# Patient Record
Sex: FEMALE | Race: WHITE
[De-identification: names, ages, dates, MRNs, and addresses within clinical notes are randomized per-mention and may not be internally consistent; named-entity substitution may affect disease eponyms.]

---

## 2018-04-07 ENCOUNTER — HOSPITAL ENCOUNTER (INPATIENT)
Dept: HOSPITAL 92 - ERS | Age: 53
LOS: 2 days | Discharge: HOME | DRG: 683 | End: 2018-04-09
Attending: FAMILY MEDICINE | Admitting: FAMILY MEDICINE
Payer: SELF-PAY

## 2018-04-07 VITALS — BODY MASS INDEX: 36.8 KG/M2

## 2018-04-07 DIAGNOSIS — Z88.1: ICD-10-CM

## 2018-04-07 DIAGNOSIS — Z88.8: ICD-10-CM

## 2018-04-07 DIAGNOSIS — R82.71: ICD-10-CM

## 2018-04-07 DIAGNOSIS — Z79.4: ICD-10-CM

## 2018-04-07 DIAGNOSIS — E11.65: ICD-10-CM

## 2018-04-07 DIAGNOSIS — J01.90: ICD-10-CM

## 2018-04-07 DIAGNOSIS — Z91.040: ICD-10-CM

## 2018-04-07 DIAGNOSIS — E11.22: ICD-10-CM

## 2018-04-07 DIAGNOSIS — Z91.14: ICD-10-CM

## 2018-04-07 DIAGNOSIS — N18.3: ICD-10-CM

## 2018-04-07 DIAGNOSIS — N17.9: Primary | ICD-10-CM

## 2018-04-07 DIAGNOSIS — E87.2: ICD-10-CM

## 2018-04-07 DIAGNOSIS — I12.9: ICD-10-CM

## 2018-04-07 LAB
ALBUMIN SERPL BCG-MCNC: 3.5 G/DL (ref 3.5–5)
ALP SERPL-CCNC: 97 U/L (ref 40–150)
ALT SERPL W P-5'-P-CCNC: 14 U/L (ref 8–55)
ANION GAP SERPL CALC-SCNC: 14 MMOL/L (ref 10–20)
ANION GAP SERPL CALC-SCNC: 15 MMOL/L (ref 10–20)
ANION GAP SERPL CALC-SCNC: 15 MMOL/L (ref 10–20)
ANION GAP SERPL CALC-SCNC: 18 MMOL/L (ref 10–20)
AST SERPL-CCNC: 11 U/L (ref 5–34)
BASOPHILS # BLD AUTO: 0.1 THOU/UL (ref 0–0.2)
BASOPHILS NFR BLD AUTO: 0.6 % (ref 0–1)
BICARBONATE (HCO3V): 18.2 MMOL/L (ref 1–85)
BILIRUB SERPL-MCNC: 0.3 MG/DL (ref 0.2–1.2)
BUN SERPL-MCNC: 28 MG/DL (ref 9.8–20.1)
BUN SERPL-MCNC: 29 MG/DL (ref 9.8–20.1)
BUN SERPL-MCNC: 31 MG/DL (ref 9.8–20.1)
BUN SERPL-MCNC: 32 MG/DL (ref 9.8–20.1)
CA-I BLD-SCNC: 1.14 MMOL/L (ref 1.12–1.32)
CALCIUM SERPL-MCNC: 8.1 MG/DL (ref 7.8–10.44)
CALCIUM SERPL-MCNC: 8.2 MG/DL (ref 7.8–10.44)
CALCIUM SERPL-MCNC: 8.5 MG/DL (ref 7.8–10.44)
CALCIUM SERPL-MCNC: 9 MG/DL (ref 7.8–10.44)
CHLORIDE SERPL-SCNC: 101 MMOL/L (ref 98–107)
CHLORIDE SERPL-SCNC: 106 MMOL/L (ref 98–113)
CHLORIDE SERPL-SCNC: 108 MMOL/L (ref 98–107)
CHLORIDE SERPL-SCNC: 108 MMOL/L (ref 98–107)
CHLORIDE SERPL-SCNC: 109 MMOL/L (ref 98–107)
CO2 BLDV CALC-SCNC: 19.6 MMOL/L (ref 1–85)
CO2 SERPL-SCNC: 14 MMOL/L (ref 22–29)
CO2 SERPL-SCNC: 15 MMOL/L (ref 22–29)
CO2 SERPL-SCNC: 15 MMOL/L (ref 22–29)
CO2 SERPL-SCNC: 17 MMOL/L (ref 22–29)
CO2 TENSION (PVCO2): 45.7 MMHG (ref 41–51)
CREAT CL PREDICTED SERPL C-G-VRATE: 0 ML/MIN (ref 70–130)
CREAT CL PREDICTED SERPL C-G-VRATE: 57 ML/MIN (ref 70–130)
CREAT CL PREDICTED SERPL C-G-VRATE: 61 ML/MIN (ref 70–130)
CREAT CL PREDICTED SERPL C-G-VRATE: 61 ML/MIN (ref 70–130)
CRYSTAL-AUWI FLAG: 0 (ref 0–15)
CRYSTAL-AUWI FLAG: 0 (ref 0–15)
EOSINOPHIL # BLD AUTO: 0.2 THOU/UL (ref 0–0.7)
EOSINOPHIL NFR BLD AUTO: 1.6 % (ref 0–10)
GLOBULIN SER CALC-MCNC: 3.5 G/DL (ref 2.4–3.5)
GLUCOSE SERPL-MCNC: 139 MG/DL (ref 70–105)
GLUCOSE SERPL-MCNC: 168 MG/DL (ref 70–105)
GLUCOSE SERPL-MCNC: 218 MG/DL (ref 70–105)
GLUCOSE SERPL-MCNC: 376 MG/DL (ref 70–105)
GLUCOSE UR STRIP-MCNC: 100 MG/DL
GLUCOSE UR STRIP-MCNC: 500 MG/DL
HCT VFR BLD CALC: 52 % (ref 36–52)
HEMOGLOBIN - CALC: 17.7 G/DL (ref 12–18)
HEV IGM SER QL: 25.4 (ref 0–7.99)
HEV IGM SER QL: 52.3 (ref 0–7.99)
HGB BLD-MCNC: 14.7 G/DL (ref 12–16)
HYALINE CASTS #/AREA URNS LPF: (no result) LPF
HYALINE CASTS #/AREA URNS LPF: (no result) LPF
LIPASE SERPL-CCNC: 15 U/L (ref 8–78)
LYMPHOCYTES # BLD: 2.8 THOU/UL (ref 1.2–3.4)
LYMPHOCYTES NFR BLD AUTO: 20.4 % (ref 21–51)
MCH RBC QN AUTO: 28.2 PG (ref 27–31)
MCV RBC AUTO: 83.9 FL (ref 81–99)
MONOCYTES # BLD AUTO: 0.7 THOU/UL (ref 0.11–0.59)
MONOCYTES NFR BLD AUTO: 5.2 % (ref 0–10)
NEUTROPHILS # BLD AUTO: 9.8 THOU/UL (ref 1.4–6.5)
NEUTROPHILS NFR BLD AUTO: 72.2 % (ref 42–75)
O2 TENSION (PVO2): 27.6 MMHG (ref 35–45)
PATHC CAST-AUWI FLAG: 1.16 (ref 0–2.49)
PATHC CAST-AUWI FLAG: 2.03 (ref 0–2.49)
PLATELET # BLD AUTO: 491 THOU/UL (ref 130–400)
POTASSIUM SERPL-SCNC: 3.7 MMOL/L (ref 3.5–5.1)
POTASSIUM SERPL-SCNC: 3.8 MMOL/L (ref 3.5–5.1)
POTASSIUM SERPL-SCNC: 3.9 MMOL/L (ref 3.5–5.1)
POTASSIUM SERPL-SCNC: 4 MMOL/L (ref 3.4–4.7)
POTASSIUM SERPL-SCNC: 4 MMOL/L (ref 3.5–5.1)
PROT UR STRIP.AUTO-MCNC: 30 MG/DL
RBC # BLD AUTO: 5.21 MILL/UL (ref 4.2–5.4)
RBC UR QL AUTO: (no result) HPF (ref 0–3)
RBC UR QL AUTO: (no result) HPF (ref 0–3)
SAO2 % BLDV FROM PO2: 39.6 % (ref 94–98)
SODIUM SERPL-SCNC: 130 MMOL/L (ref 136–145)
SODIUM SERPL-SCNC: 133 MMOL/L (ref 136–145)
SODIUM SERPL-SCNC: 133 MMOL/L (ref 138–145)
SODIUM SERPL-SCNC: 134 MMOL/L (ref 136–145)
SODIUM SERPL-SCNC: 136 MMOL/L (ref 136–145)
SP GR UR STRIP: 1.01 (ref 1–1.04)
SP GR UR STRIP: 1.02 (ref 1–1.04)
SPERM-AUWI FLAG: 0 (ref 0–9.9)
SPERM-AUWI FLAG: 0 (ref 0–9.9)
WBC # BLD AUTO: 13.6 THOU/UL (ref 4.8–10.8)
WBC UR QL AUTO: (no result) HPF (ref 0–3)
YEAST-AUWI FLAG: 0 (ref 0–25)
YEAST-AUWI FLAG: 0 (ref 0–25)

## 2018-04-07 PROCEDURE — 87186 SC STD MICRODIL/AGAR DIL: CPT

## 2018-04-07 PROCEDURE — 87086 URINE CULTURE/COLONY COUNT: CPT

## 2018-04-07 PROCEDURE — 83036 HEMOGLOBIN GLYCOSYLATED A1C: CPT

## 2018-04-07 PROCEDURE — 85025 COMPLETE CBC W/AUTO DIFF WBC: CPT

## 2018-04-07 PROCEDURE — 82803 BLOOD GASES ANY COMBINATION: CPT

## 2018-04-07 PROCEDURE — 83690 ASSAY OF LIPASE: CPT

## 2018-04-07 PROCEDURE — 82010 KETONE BODYS QUAN: CPT

## 2018-04-07 PROCEDURE — 87040 BLOOD CULTURE FOR BACTERIA: CPT

## 2018-04-07 PROCEDURE — 71046 X-RAY EXAM CHEST 2 VIEWS: CPT

## 2018-04-07 PROCEDURE — 96374 THER/PROPH/DIAG INJ IV PUSH: CPT

## 2018-04-07 PROCEDURE — 83605 ASSAY OF LACTIC ACID: CPT

## 2018-04-07 PROCEDURE — 80053 COMPREHEN METABOLIC PANEL: CPT

## 2018-04-07 PROCEDURE — 96375 TX/PRO/DX INJ NEW DRUG ADDON: CPT

## 2018-04-07 PROCEDURE — 36416 COLLJ CAPILLARY BLOOD SPEC: CPT

## 2018-04-07 PROCEDURE — 80048 BASIC METABOLIC PNL TOTAL CA: CPT

## 2018-04-07 PROCEDURE — A4216 STERILE WATER/SALINE, 10 ML: HCPCS

## 2018-04-07 PROCEDURE — 36415 COLL VENOUS BLD VENIPUNCTURE: CPT

## 2018-04-07 PROCEDURE — 87077 CULTURE AEROBIC IDENTIFY: CPT

## 2018-04-07 PROCEDURE — 86140 C-REACTIVE PROTEIN: CPT

## 2018-04-07 PROCEDURE — 81003 URINALYSIS AUTO W/O SCOPE: CPT

## 2018-04-07 PROCEDURE — 82330 ASSAY OF CALCIUM: CPT

## 2018-04-07 PROCEDURE — 85652 RBC SED RATE AUTOMATED: CPT

## 2018-04-07 PROCEDURE — 96361 HYDRATE IV INFUSION ADD-ON: CPT

## 2018-04-07 PROCEDURE — 81015 MICROSCOPIC EXAM OF URINE: CPT

## 2018-04-07 PROCEDURE — 70450 CT HEAD/BRAIN W/O DYE: CPT

## 2018-04-07 PROCEDURE — 83735 ASSAY OF MAGNESIUM: CPT

## 2018-04-07 NOTE — CT
CT HEAD NONCONTRAST:

 

Date:  04/07/18 

 

HISTORY:  

Headache. 

 

FINDINGS:

 

No comparison. 

 

There is no evidence of acute intracranial hemorrhage or infarct. The ventricles appear normal in siz
e, shape, and position. There is no mass effect or shift of midline structures. Dense mucosal opacifi
cation involves the right sphenoid sinus cell and posterior right ethmoid air cells. Some mucosal thi
ckening is also evident within the superior portion of the partially visualized right maxillary sinus
. Small amount of fluid layers within the dependent portion of the left sphenoid sinus cell. 

 

IMPRESSION: 

1.  No acute intracranial abnormalities are demonstrated on noncontrast CT head. 

 

2.  Paranasal sinusitis involving primarily the right sphenoid sinus and right ethmoid air cells. 

 

 

 

POS: [facility]

## 2018-04-07 NOTE — RAD
PA AND LATERAL CHEST X-RAY

4/7/18

 

HISTORY: 

DKA. Headache, nausea and vomiting. 

 

COMPARISON:  

None available.

 

FINDINGS:  

The cardiac silhouette and pulmonary vasculature are within normal limits. The lungs are clear. osseo
us structures are intact. Minimal vascular calcifications seen in the visualized abdominal aorta. 

 

IMPRESSION:  

No acute cardiopulmonary process. 

 

POS: [facility]

## 2018-04-07 NOTE — PDOC.FPRHP
- History of Present Illness


Chief Complaint: N/V


History of Present Illness: 


51 y/o F with PMHx of DM2 presents with N/V, H/A and congestion. The patient 

reports that her congestion and headache have been going on for about 2 weeks 

and have been more R sided. The pain radiates to her R ear. She has noticed 

more sinus congestion. She started having N/V the past couple of days. She 

denies F/C. She normally checks her blood sugar and the fasting runs about 110-

150, but she reports that she hasn't been checking it as much since she's been 

sick, but she has still been taking her insulin. 


ED Course: 


The patient was evaluated in the ED by Dr. Desir and was given 1g Rocephin, 

Novolin 10U, 1L NS, Fentanyl 100mcg. 





- Allergies/Adverse Reactions


 Allergies











Allergy/AdvReac Type Severity Reaction Status Date / Time


 


ciprofloxacin Allergy Severe  Verified 04/07/18 15:27


 


Latex, Natural Rubber Allergy Severe  Verified 04/07/18 15:27


 


levofloxacin [From Levaquin] Allergy Severe  Verified 04/07/18 15:27


 


propofol Allergy Severe  Verified 04/07/18 15:27


 


Quinolones Allergy Severe  Verified 04/07/18 15:27


 


strawberry [Strawberry] Allergy Severe  Verified 04/07/18 15:27


 


moxifloxacin [From Avelox] Allergy   Verified 04/07/18 15:27


 


FIGS Allergy Severe  Uncoded 11/13/13 16:46














- Home Medications


 











 Medication  Instructions  Recorded  Confirmed  Type


 


Insulin NPH/Reg Insulin Hm 60 unit SC BID-AC 04/07/18 04/07/18 History





[HumuLIN 70/30]    


 


Insulin Regular [HumuLIN R Vial] PRN 04/07/18  History


 


Lisinopril 10 mg PO DAILY 04/07/18 04/07/18 History


 


Metoprolol Tartrate 25 mg PO DAILY 04/07/18 04/07/18 History














- History


PMHx:


1. DM2


2. h/o DKA with diabetic coma


3. HTN


 


PSHx: 


1. Tracheostomy - has been removed





FHx: DM2 in both parents


 


Social: Denies tobacco, EtOH, or drug use


 








- Review of Systems


General: denies: fever/chills, weight/appetite/sleep changes


Eyes: reports: eye pain (R eye due to sinus pressure).  denies: vision changes


ENT: reports: nasal congestion.  denies: rhinorrhea


Respiratory: denies: cough, shortness of breath


Cardiovascular: denies: chest pain, edema


Gastrointestinal: reports: nausea, vomiting, abdominal pain.  denies: diarrhea


Genitourinary: denies: dysuria, polyuria


Skin: denies: rashes, lesions


Musculoskeletal: denies: pain, tenderness


Neurological: denies: numbness, weakness


Psychological: denies: anxiety, depression





- Vital signs


BP: 110/68  HR: 77 RR: 17 Tmax: 97.5 Pox: 99% on RA  Wt: 99.79kg   








- Physical Exam


Constitutional: NAD, awake, alert and oriented, well developed


HEENT: normocephalic and atraumatic, PERRLA, grossly normal vision, grossly 

normal hearing, normal nasal mucosa, MMM, oropharynx clear, other (sinus pain 

on R side)


Neck: supple, FROM, no LAD


Heart: RRR, normal S1/S2, no murmurs/rubs/gallops


Lungs: CTAB, no respiratory distress, good air movement, no wheezing


Abdomen: soft, non-tender, bowel sounds present


Musculoskeletal: normal structure, normal tone


Neurological: no focal deficit, CN II-XII intact


Skin: good turgor, capillary refill <2 seconds


Heme/Lymphatic: no unusual bruising or bleeding, no petechia


Psychiatric: normal mood and affect, good judgment and insight





FMR H&P: Results





- Labs


Result Diagrams: 


 04/07/18 11:33





 04/07/18 11:33


Lab results: 


 











WBC  13.6 thou/uL (4.8-10.8)  H  04/07/18  11:33    


 


Hgb  14.7 g/dL (12.0-16.0)   04/07/18  11:33    


 


Hct  43.7 % (36.0-47.0)   04/07/18  11:33    


 


MCV  83.9 fl (81.0-99.0)   04/07/18  11:33    


 


Plt Count  491 thou/uL (130-400)  H  04/07/18  11:33    


 


Neutrophils %  72.2 % (42.0-75.0)   04/07/18  11:33    


 


VBG pCO2  45.7 mmHg (41.0-51.0)   04/07/18  14:00    


 


VBG pO2  27.6 mmHg (35.0-45.0)  L  04/07/18  14:00    


 


Sodium  130 mmol/L (136-145)  L  04/07/18  11:33    


 


Potassium  3.8 mmol/L (3.5-5.1)   04/07/18  11:33    


 


Chloride  101 mmol/L ()   04/07/18  11:33    


 


Carbon Dioxide  15 mmol/L (22-29)  L  04/07/18  11:33    


 


BUN  32 mg/dL (9.8-20.1)  H  04/07/18  11:33    


 


Creatinine  2.13 mg/dL (0.6-1.1)  H  04/07/18  11:33    


 


Glucose  376 mg/dL ()  H  04/07/18  11:33    


 


Calcium  9.0 mg/dL (7.8-10.44)   04/07/18  11:33    


 


Total Bilirubin  0.3 mg/dL (0.2-1.2)   04/07/18  11:33    


 


AST  11 U/L (5-34)   04/07/18  11:33    


 


ALT  14 U/L (8-55)   04/07/18  11:33    


 


Alkaline Phosphatase  97 U/L ()   04/07/18  11:33    


 


Serum Total Protein  7.0 g/dL (6.0-8.3)   04/07/18  11:33    


 


Albumin  3.5 g/dL (3.5-5.0)   04/07/18  11:33    


 


Lipase  15 U/L (8-78)   04/07/18  11:33    


 


Urine Ketones  Negative mg/dL (Negative)   04/07/18  11:20    


 


Urine Blood  Negative  (Negative)   04/07/18  11:20    


 


Urine Nitrite  Negative  (Negative)   04/07/18  11:20    


 


Ur Leukocyte Esterase  Small  (Negative)  H  04/07/18  11:20    


 


Urine RBC  0-3 HPF (0-3)   04/07/18  11:20    


 


Urine WBC  11-20 HPF (0-3)  H  04/07/18  11:20    


 


Ur Squamous Epith Cells  11-20 HPF (0-3)  H  04/07/18  11:20    


 


Urine Bacteria  None Seen HPF (None Seen)   04/07/18  11:20    














- Radiology Interpretation


  ** CT scan - head


Status: report reviewed by me


Additional comment: 


R sided sinusitis





FMR H&P: A/P





- Problem List


(1) Hyperosmolar non-ketotic state in patient with type 2 diabetes mellitus


Current Visit: Yes   Status: Acute   Code(s): E11.01 - TYPE 2 DIABETES MELLITUS 

WITH HYPEROSMOLARITY WITH COMA   





(2) Sinusitis


Current Visit: Yes   Status: Acute   Code(s): J32.9 - CHRONIC SINUSITIS, 

UNSPECIFIED   


Qualifiers: 


   Sinusitis location: unspecified location   Chronicity: acute   Recurrence: 

non-recurrent   Qualified Code(s): J01.90 - Acute sinusitis, unspecified   





(3) Lactic acidosis


Current Visit: Yes   Status: Acute   Code(s): E87.2 - ACIDOSIS   





(4) Type 2 diabetes mellitus


Current Visit: Yes   Status: Acute   


Qualifiers: 


   Diabetes mellitus long term insulin use: with long term use   Diabetes 

mellitus complication status: with unspecified complications   Qualified Code(s)

: E11.8 - Type 2 diabetes mellitus with unspecified complications; Z79.4 - Long 

term (current) use of insulin; Z79.4 - Long term (current) use of insulin; 

Z79.4 - Long term (current) use of insulin; Z79.4 - Long term (current) use of 

insulin   





(5) KEISHA (acute kidney injury)


Current Visit: Yes   Status: Acute   Code(s): N17.9 - ACUTE KIDNEY FAILURE, 

UNSPECIFIED   





(6) CKD (chronic kidney disease) stage 3, GFR 30-59 ml/min


Current Visit: Yes   Status: Acute   Code(s): N18.3 - CHRONIC KIDNEY DISEASE, 

STAGE 3 (MODERATE)   





(7) Leukocytosis


Current Visit: Yes   Status: Acute   Code(s): D72.829 - ELEVATED WHITE BLOOD 

CELL COUNT, UNSPECIFIED   


Qualifiers: 


   Leukocytosis type: unspecified   Qualified Code(s): D72.829 - Elevated white 

blood cell count, unspecified   





(8) Increased anion gap metabolic acidosis


Current Visit: Yes   Status: Acute   Code(s): E87.2 - ACIDOSIS   





- Plan


Mild HHS


Patient has elevated blood sugar with anion gap and KEISHA, but this could be 

confounded by her lactic acidosis. She is s/p 1L NS in the ED as well as 10U 

insulin. Ketones were negative


-NS bolus


-q2h accuchecks


-q4h BMP


-Insulin pending repeat BMP


-supplement KCl as needed





Anion Gap Metabolic Acidosis, likely 2/2 lactic acidosis


The patient appears to have sinusitis on CT scan. Will check ESR and CRP as 

well. Lactate elevated at 2.6. pH 7.2


-NS bolus


-Trend lactate


-Monitor closely


-UCx, BCx





Acute Sinusitis


s/p 1 dose of Rocephin in the ED


-Rocephin day 1, will continue


-NS bolus followed by NS @ 140


-ESR and CRP to ensure no autoimmune process with the severe headache


-Blood cx





KEISHA on CKDIII


s/p 1L NS in the ED


-Will bolus with another 2 L NS followed by maint of NS @ 140


-Monitor





DM2


-A1c 


-Continue home meds once stable





VTE ppx: SCD's


Code Status: Full


Disposition/LOS: 


Admit to medical


Length of stay likely greater than 2 days





FMR H&P: Upper Level





- Plan


Date/Time: 04/07/18 3587








INadege, PY3, have evaluated this patient and agree with 

findings/plan as outlined by intern resident. Pertinent changes/additions are 

listed here.


This is a 51 yo F w/ PMH of DM2, Angioedema, presents w/ 2 week history of 

headache, and sinus pain, then 3 days ago she started having a right sided 

headache, which is throbbing associated with sinus drainage, that has been 

constant. No vision change, no hearing changes. She has tried ibuprofen and 

tylenol without relief. She typically doesn't get headaches.


Nothing seems to make it worse or better.  + non-productive cough. No urinary 

or bowel changes. 


PE: AOX4, in no acute distress. 


HEENT: + sinus pain R, TM not bulging or erythematous. + PND


CV: RRR. No murmurs.


Resp: CTA bilaterally


Abdomen: Soft non-tender to palpation. No rebound, no guarding.


Ext: No edema bilaterally.





A/P:


1) Mild DKA - Patient has just received 10U of insulin in the ER and starting 

her 1st 2L bolus of NS. Will repeat another bolus of fluid and give 20meq of 

potassium. F/u with BMP once completed. 


2) KEISHA on CKD - Fluids. F/u with BMP.


3) Acute Sinusitis - S/p Rocephin. Will continue rocephin and f/u with urine cx

, blood cx.


4) Headache likely 2/2 #3. Will also add CRP and ESR to ensure no autoimmune 

process.


5) Lactic acidosis - repeat lactic acid in 4 hours after starting fluids likely 

contributing to mild anion gap.


6) Hx of hypomagnesium - will check Mag.

## 2018-04-07 NOTE — PDOC.EVN
Event Note





- Event Note


Event Note: 





Re-evaluated patient's headache. Headache has resolved after medications in the 

ER. She states that she hasn't had any more nausea, and able to give a better 

history now as not current vomiting. States that she is very sensitive to 

medications, and has nausea/vomiting with meds. She feels like she has a sinus 

headache, as pain over maxillary and ethmoid sinuses. She has no pain over 

temporal arteries.  She denies any vision changes, hearing changes.


Discussed differential diagnosis with patient, including temporal arteritis, 

however, this is very unlikely after re-evaluation of patient, and with CT scan 

consistent with R sinusitis, which is where her pain is, and her pain has 

resolved, we will continue antibiotics for this.  


She will notify us if headache returns or any other concerns.

## 2018-04-08 LAB
ANION GAP SERPL CALC-SCNC: 10 MMOL/L (ref 10–20)
ANION GAP SERPL CALC-SCNC: 11 MMOL/L (ref 10–20)
ANION GAP SERPL CALC-SCNC: 8 MMOL/L (ref 10–20)
BASOPHILS # BLD AUTO: 0.1 THOU/UL (ref 0–0.2)
BASOPHILS NFR BLD AUTO: 0.6 % (ref 0–1)
BUN SERPL-MCNC: 22 MG/DL (ref 9.8–20.1)
BUN SERPL-MCNC: 22 MG/DL (ref 9.8–20.1)
BUN SERPL-MCNC: 25 MG/DL (ref 9.8–20.1)
CALCIUM SERPL-MCNC: 7.7 MG/DL (ref 7.8–10.44)
CALCIUM SERPL-MCNC: 7.8 MG/DL (ref 7.8–10.44)
CALCIUM SERPL-MCNC: 7.8 MG/DL (ref 7.8–10.44)
CHLORIDE SERPL-SCNC: 112 MMOL/L (ref 98–107)
CHLORIDE SERPL-SCNC: 113 MMOL/L (ref 98–107)
CHLORIDE SERPL-SCNC: 113 MMOL/L (ref 98–107)
CO2 SERPL-SCNC: 17 MMOL/L (ref 22–29)
CREAT CL PREDICTED SERPL C-G-VRATE: 72 ML/MIN (ref 70–130)
CREAT CL PREDICTED SERPL C-G-VRATE: 80 ML/MIN (ref 70–130)
CREAT CL PREDICTED SERPL C-G-VRATE: 84 ML/MIN (ref 70–130)
EOSINOPHIL # BLD AUTO: 0.3 THOU/UL (ref 0–0.7)
EOSINOPHIL NFR BLD AUTO: 2.8 % (ref 0–10)
GLUCOSE SERPL-MCNC: 136 MG/DL (ref 70–105)
GLUCOSE SERPL-MCNC: 170 MG/DL (ref 70–105)
GLUCOSE SERPL-MCNC: 197 MG/DL (ref 70–105)
HGB BLD-MCNC: 12.7 G/DL (ref 12–16)
LYMPHOCYTES # BLD: 2 THOU/UL (ref 1.2–3.4)
LYMPHOCYTES NFR BLD AUTO: 16.9 % (ref 21–51)
MCH RBC QN AUTO: 29.5 PG (ref 27–31)
MCV RBC AUTO: 83.4 FL (ref 81–99)
MONOCYTES # BLD AUTO: 0.7 THOU/UL (ref 0.11–0.59)
MONOCYTES NFR BLD AUTO: 5.6 % (ref 0–10)
NEUTROPHILS # BLD AUTO: 8.8 THOU/UL (ref 1.4–6.5)
NEUTROPHILS NFR BLD AUTO: 74.1 % (ref 42–75)
PLATELET # BLD AUTO: 357 THOU/UL (ref 130–400)
POTASSIUM SERPL-SCNC: 3.7 MMOL/L (ref 3.5–5.1)
POTASSIUM SERPL-SCNC: 3.9 MMOL/L (ref 3.5–5.1)
POTASSIUM SERPL-SCNC: 3.9 MMOL/L (ref 3.5–5.1)
RBC # BLD AUTO: 4.32 MILL/UL (ref 4.2–5.4)
SODIUM SERPL-SCNC: 134 MMOL/L (ref 136–145)
SODIUM SERPL-SCNC: 135 MMOL/L (ref 136–145)
SODIUM SERPL-SCNC: 137 MMOL/L (ref 136–145)
WBC # BLD AUTO: 11.9 THOU/UL (ref 4.8–10.8)

## 2018-04-08 RX ADMIN — INSULIN HUMAN SCH: 100 INJECTION, SUSPENSION SUBCUTANEOUS at 08:54

## 2018-04-08 NOTE — PDOC.FM
- Subjective


Subjective: 


The patient reports that her headache has been better today and well controlled 

with the pain medication. She still feels congestion on the R side just behind 

her eye and her R cheek and R forehead. She denies any R temple pain. She is 

tolerating PO, but still reports some N/V. 





- Objective


MAR Reviewed: Yes


Vital Signs & Weight: 


 Vital Signs (12 hours)











  Temp Pulse Resp BP Pulse Ox


 


 04/08/18 03:46  98.9 F  71  18  118/70  99


 


 04/08/18 00:00  97.7 F  71  18  104/67  97


 


 04/07/18 20:00  98.6 F  73  16  115/84  99








 Weight











Weight                         97.477 kg














I&O: 


 











 04/07/18 04/08/18 04/09/18





 06:59 06:59 06:59


 


Intake Total  1600 


 


Balance  1600 











Result Diagrams: 


 04/08/18 03:02





 04/08/18 05:50





<Tonie Rosas - Last Filed: 04/08/18 07:58>





- Objective


Vital Signs & Weight: 


 Vital Signs (12 hours)











  Temp Pulse Resp BP Pulse Ox


 


 04/08/18 08:00  98.7 F  71  18  117/72  97


 


 04/08/18 03:46  98.9 F  71  18  118/70  99


 


 04/08/18 00:00  97.7 F  71  18  104/67  97








 Weight











Weight                         214 lb 14.4 oz














I&O: 


 











 04/07/18 04/08/18 04/09/18





 06:59 06:59 06:59


 


Intake Total  1600 


 


Balance  1600 











Result Diagrams: 


 04/08/18 03:02





 04/08/18 05:50





<Ward Robertson - Last Filed: 04/08/18 11:06>





Phys Exam





- Physical Examination


Constitutional: NAD


HEENT: moist MMs


tender to palpation over R maxillary sinus


Respiratory: no wheezing, no rales, no rhonchi, clear to auscultation bilateral


Cardiovascular: RRR, no significant murmur, no rub


Gastrointestinal: soft, non-tender, no distention, positive bowel sounds


Musculoskeletal: no edema, pulses present


Neurological: non-focal, moves all 4 limbs


Psychiatric: normal affect, A&O x 3


Skin: normal turgor, cap refill <2 seconds





<Tonie Rosas - Last Filed: 04/08/18 07:58>





Dx/Plan


(1) Sinusitis


Code(s): J32.9 - CHRONIC SINUSITIS, UNSPECIFIED   Status: Acute   


  QualifierTitle:    Sinusitis location: unspecified location   Chronicity: 

acute   Recurrence: non-recurrent   Qualified Code(s): J01.90 - Acute sinusitis

, unspecified   





(2) Hyperosmolar non-ketotic state in patient with type 2 diabetes mellitus


Code(s): E11.01 - TYPE 2 DIABETES MELLITUS WITH HYPEROSMOLARITY WITH COMA   

Status: Ruled-out   





(3) Lactic acidosis


Code(s): E87.2 - ACIDOSIS   Status: Acute   





(4) Type 2 diabetes mellitus


Status: Acute   


  QualifierTitle:    Diabetes mellitus long term insulin use: with long term 

use   Diabetes mellitus complication status: with unspecified complications   

Qualified Code(s): E11.8 - Type 2 diabetes mellitus with unspecified 

complications; Z79.4 - Long term (current) use of insulin; Z79.4 - Long term (

current) use of insulin; Z79.4 - Long term (current) use of insulin; Z79.4 - 

Long term (current) use of insulin   





(5) KEISHA (acute kidney injury)


Code(s): N17.9 - ACUTE KIDNEY FAILURE, UNSPECIFIED   Status: Acute   





(6) CKD (chronic kidney disease) stage 3, GFR 30-59 ml/min


Code(s): N18.3 - CHRONIC KIDNEY DISEASE, STAGE 3 (MODERATE)   Status: Acute   





(7) Leukocytosis


Code(s): D72.829 - ELEVATED WHITE BLOOD CELL COUNT, UNSPECIFIED   Status: Acute

   


  QualifierTitle:    Leukocytosis type: unspecified   Qualified Code(s): 

D72.829 - Elevated white blood cell count, unspecified   





(8) Increased anion gap metabolic acidosis


Code(s): E87.2 - ACIDOSIS   Status: Acute   





- Plan


Plan: 


Anion Gap Metabolic Acidosis, likely 2/2 lactic acidosis


The patient appears to have sinusitis on CT scan. Lactate elevated at 2.6. pH 

7.2. s/p fluids. Lactate has trended down. 


-Monitor closely


-UCx, BCx





Acute Sinusitis


s/p 1 dose of Rocephin in the ED. ESR and CRP were elevated, but the patient 

has no pain over the temples that would be consistent with temporal arteritis. 


-Rocephin day 2, will continue


-NS @ 140


-Blood cx





KEISHA on CKDIII


s/p fluids, improving


-NS @ 140


-Monitor





DM2


Patient has elevated blood sugar with anion gap and KEISHA, but this could be 

confounded by her lactic acidosis. She is s/p 1L NS in the ED as well as 10U 

insulin. Ketones were negative. A1c was 13.2


-Start 30 U 70/30 this AM and will 50 U this PM as well as SSI


-Accuchecks q2h


-CC diet








<Tonie Rosas - Last Filed: 04/08/18 07:58>





Attending Addendum





- Attending Addendum


Date/Time: 04/08/18 1105





I personally evaluated the patient and discussed the management with Dr. Tonie Rosas


I agree with the History, Examination, Assessment and Plan documented above 

with any addition or exceptions noted below.





CT scan consistent with acute R sided sinusitis. Will treat with IV abx. 

Treatment for pain relief. 





<Ward Robertson - Last Filed: 04/08/18 11:06>

## 2018-04-09 VITALS — SYSTOLIC BLOOD PRESSURE: 133 MMHG | DIASTOLIC BLOOD PRESSURE: 63 MMHG | TEMPERATURE: 98.1 F

## 2018-04-09 RX ADMIN — INSULIN HUMAN SCH UNIT: 100 INJECTION, SUSPENSION SUBCUTANEOUS at 07:30

## 2018-04-09 NOTE — PDOC.FM
- Subjective


Subjective: 





Patient doing well this AM. No significant overnight events. Patient states her 

facial pain and headache are much improved from admission. She is feeling a lot 

better and was able to tolerate dinner. Discussed importance of taking insulin 

at length to avoid hyperglycemia and DKA. 





- Objective


MAR Reviewed: Yes


Vital Signs & Weight: 


 Vital Signs (12 hours)











  Temp Pulse Resp BP Pulse Ox


 


 04/09/18 07:52  98.1 F  67  16  


 


 04/09/18 07:36  98.1 F  67  16  133/63  98


 


 04/09/18 05:39  98.3 F  74  18  128/82  98


 


 04/09/18 00:00  98.3 F  71  18  125/81  96








 Weight











Weight                         97.477 kg














I&O: 


 











 04/08/18 04/09/18 04/10/18





 06:59 06:59 06:59


 


Intake Total 1600 2860 


 


Output Total  500 


 


Balance 1600 2360 











Result Diagrams: 


 04/08/18 03:02





 04/08/18 05:50


EKG Reviewed by me: No


Radiology Reviewed by me: No





<Marissa Hu - Last Filed: 04/09/18 08:57>





- Objective


Vital Signs & Weight: 


 Vital Signs (12 hours)











  Temp Pulse Resp BP Pulse Ox


 


 04/09/18 07:52  98.1 F  67  16  


 


 04/09/18 07:36  98.1 F  67  16  133/63  98








 Weight











Weight                         97.477 kg














I&O: 


 











 04/08/18 04/09/18 04/10/18





 06:59 06:59 06:59


 


Intake Total 1600 2860 


 


Output Total  500 


 


Balance 1600 2360 











Result Diagrams: 


 04/08/18 03:02





 04/08/18 05:50





<Alva Humphrey - Last Filed: 04/09/18 19:25>





Phys Exam





- Physical Examination


Constitutional: NAD


HEENT: moist MMs


Mildly tender to palpation in maxillary regions; improved from prior exams


Neck: supple


Respiratory: clear to auscultation bilateral


Cardiovascular: RRR, no significant murmur


Gastrointestinal: soft, non-tender, no distention, positive bowel sounds


Musculoskeletal: no edema


Neurological: non-focal


Psychiatric: normal affect


Skin: no rash, normal turgor, cap refill <2 seconds





<Marissa Hu - Last Filed: 04/09/18 08:57>





Dx/Plan


(1) Increased anion gap metabolic acidosis


Code(s): E87.2 - ACIDOSIS   Status: Acute   





(2) Sinusitis


Code(s): J32.9 - CHRONIC SINUSITIS, UNSPECIFIED   Status: Acute   


  QualifierTitle:    Sinusitis location: unspecified location   Chronicity: 

acute   Recurrence: non-recurrent   Qualified Code(s): J01.90 - Acute sinusitis

, unspecified   





(3) KEISHA (acute kidney injury)


Code(s): N17.9 - ACUTE KIDNEY FAILURE, UNSPECIFIED   Status: Acute   





(4) Lactic acidosis


Code(s): E87.2 - ACIDOSIS   Status: Acute   





(5) Type 2 diabetes mellitus


Status: Chronic   


  QualifierTitle:    Diabetes mellitus long term insulin use: with long term 

use   Diabetes mellitus complication status: with unspecified complications   

Qualified Code(s): E11.8 - Type 2 diabetes mellitus with unspecified 

complications; Z79.4 - Long term (current) use of insulin; Z79.4 - Long term (

current) use of insulin; Z79.4 - Long term (current) use of insulin; Z79.4 - 

Long term (current) use of insulin   





- Plan


Plan: 





Anion Gap Metabolic Acidosis, likely 2/2 lactic acidosis (resolved)


The patient appears to have sinusitis on CT scan. Lactate elevated at 2.6, 

which has since normalized. pH 7.2. s/p fluids. 


-Monitor closely


-UCx, BCx pending (negative to date)


-Improved after treatment with abx for sinusitis 





Acute Sinusitis


s/p 1 dose of Rocephin in the ED. ESR and CRP were elevated, but the patient 

has no pain over the temples that would be consistent with temporal arteritis. 


-Rocephin switched to unasyn yesterday. Will transition to PO antibiotics in 

preparation for discharge home


-D/C IVF


-Blood cx pending; negative growth to date





KEISHA on CKDIII


s/p fluids, improving





DM2, Uncontrolled


Patient has elevated blood sugar with anion gap and KEISHA, but this could be 

confounded by her lactic acidosis. She is s/p 1L NS in the ED as well as 10U 

insulin. -Ketones were negative. A1c was 13.2


-Start 30 U 70/30AM and 50 U PM as well as SSI; patient refused insulin all of 

yesterday. She did take 30 units last night. Patient states she is compliant 

with insulin regimen at home,  but A1c 13.2. Had lengthy discussion about good 

glucose control to prevent diabetic complications. Patient does not have PCP 

here in [place]. She states she will go back to PCP in [place] or establish with 

[facility] until she gets insurance.


-Accuchecks q2h


-CC diet


-Patient reportedly takes 60 Units of 70/30 in AM and PM, as well as Regular 

insulin sliding scale at home





Dipso: Patient being transitioned to PO antibiotics. Anticipate d/c home today. 





<Marissa Hu - Last Filed: 04/09/18 08:57>





Attending Addendum





- Attending Addendum


Date/Time: 04/09/18 1925





I personally evaluated the patient and discussed the management with Dr. Hu.


I agree with the History, Examination, Assessment and Plan documented above 

with any addition or exceptions noted below.


The patient is feeling much better.  She will be discharged with oral 

antibiotics.





<Alva Humphrey - Last Filed: 04/09/18 19:25>

## 2018-04-17 ENCOUNTER — HOSPITAL ENCOUNTER (EMERGENCY)
Dept: HOSPITAL 92 - ERS | Age: 53
LOS: 1 days | Discharge: HOME | End: 2018-04-18
Payer: SELF-PAY

## 2018-04-17 DIAGNOSIS — J01.90: Primary | ICD-10-CM

## 2018-04-17 DIAGNOSIS — Z79.899: ICD-10-CM

## 2018-04-17 DIAGNOSIS — K02.9: ICD-10-CM

## 2018-04-17 DIAGNOSIS — E11.9: ICD-10-CM

## 2018-04-17 DIAGNOSIS — Z79.4: ICD-10-CM

## 2018-04-17 LAB
BASOPHILS # BLD AUTO: 0.1 THOU/UL (ref 0–0.2)
BASOPHILS NFR BLD AUTO: 0.9 % (ref 0–1)
EOSINOPHIL # BLD AUTO: 0.5 THOU/UL (ref 0–0.7)
EOSINOPHIL NFR BLD AUTO: 3.5 % (ref 0–10)
HGB BLD-MCNC: 13.8 G/DL (ref 12–16)
LYMPHOCYTES # BLD: 3.3 THOU/UL (ref 1.2–3.4)
LYMPHOCYTES NFR BLD AUTO: 24 % (ref 21–51)
MCH RBC QN AUTO: 28.6 PG (ref 27–31)
MCV RBC AUTO: 85 FL (ref 81–99)
MONOCYTES # BLD AUTO: 0.7 THOU/UL (ref 0.11–0.59)
MONOCYTES NFR BLD AUTO: 5.2 % (ref 0–10)
NEUTROPHILS # BLD AUTO: 9.1 THOU/UL (ref 1.4–6.5)
NEUTROPHILS NFR BLD AUTO: 66.4 % (ref 42–75)
PLATELET # BLD AUTO: 465 THOU/UL (ref 130–400)
RBC # BLD AUTO: 4.84 MILL/UL (ref 4.2–5.4)
WBC # BLD AUTO: 13.6 THOU/UL (ref 4.8–10.8)

## 2018-04-17 PROCEDURE — 86140 C-REACTIVE PROTEIN: CPT

## 2018-04-17 PROCEDURE — 96375 TX/PRO/DX INJ NEW DRUG ADDON: CPT

## 2018-04-17 PROCEDURE — 85025 COMPLETE CBC W/AUTO DIFF WBC: CPT

## 2018-04-17 PROCEDURE — 96374 THER/PROPH/DIAG INJ IV PUSH: CPT

## 2018-04-17 PROCEDURE — 85652 RBC SED RATE AUTOMATED: CPT

## 2018-04-17 NOTE — CT
CT OF THE PARANASAL SINUSES:

4/17/18

 

INDICATION:

Right sided facial pain.

 

FINDINGS:  

There is moderate mucosal thickening within the right maxillary sinus. There is moderate to severe mu
cosal thickening in the right ethmoid air ells and right sphenoid sinus. There is mild to moderate mu
cosal thickening in the left sphenoid air cell. Frontal sinuses are clear. Left ethmoid air cell is r
elatively clear. Left maxillary sinus is clear. No acute osseous abnormality is evident. There are pr
ominent dental and periodontal disease involving multiple teeth within the maxilla. There is small ma
stoid effusion seen on the left. The visualized intracranial contents are unremarkable. The visualize
d parotid and orbits appear within normal limits. 

 

IMPRESSION:  

1.      Moderate paranasal sinus disease as above. 

 

2.      Extensive dental disease involving the maxilla. Dental referral is recommended. 

 

3.      Small left mastoid effusion. 

 

POS: [facility]

## 2019-04-23 NOTE — DIS-2
DATE OF ADMISSION:  04/07/2018

 

DATE OF DISCHARGE:  04/09/2018

 

ADMITTING ATTENDING:  Ward Robertson M.D.

 

DISCHARGE ATTENDING:  Alva Humphrey M.D.

 

RESIDENT:  Marissa Hu D.O.

 

CONSULTS:

1.  Dietitian.

2.  Case management.

3.  Walking program.

 

PROCEDURES:

1.  Brain CT showed no acute intracranial abnormalities, demonstrated on noncontrast CT of the head, 
paranasal sinusitis involving primarily the right sphenoid sinus and right ethmoid air cells.

2.  Chest x-ray, no acute cardiopulmonary process.

 

PRIMARY DIAGNOSES:

1.  Acute sinusitis.

2.  Hyperglycemia.

3.  Anion gap metabolic acidosis likely secondary to elevated lactate from infection.

4.  Acute kidney injury on chronic kidney disease stage 3.

5.  Uncontrolled diabetes mellitus type 2.

6.  Asymptomatic bacteriuria.

 

DISCHARGE MEDICATIONS:

1.  Tylenol with Codeine No. 3 300/30 mg tablet, take 1 tablet as needed every 6 hours for pain, 15 t
ablets given.

2.  Amoxicillin/potassium clavulanic acid or Augmentin 875 mg oral every 12 hours for a total of 5 da
ys.

3.  Mucinex 1200 mg oral every 12 hours as needed.

4.  Zofran 4 mg oral every 6 hours as needed for nausea.

5.  Oxymetazoline hydrochloride 0.05% nasal spray.

6.  Lisinopril 10 mg oral daily.

7.  Humulin 70/30, the patient is to take 60 units subcutaneous twice daily before meals.

8.  Metoprolol tartrate 25 mg oral daily.

9.  Humulin Regular, the patient is to take this on a sliding scale before meals and at bedtime as ne
eded.

 

DISCONTINUED MEDICATIONS:  None.

 

HISTORY OF PRESENT ILLNESS/HOSPITAL COURSE:  This is a 52-year-old female with past medical history o
f diabetes mellitus that presented to the emergency department with nausea, vomiting, severe headache
, and congestion.  She reported that her congestion, headache had been going on for about 2 weeks and
 appears to be more right sided.  The pain did radiate to her right ear and she has noticed more sinu
s congestion.  She started having nausea and vomiting a couple days prior to admission.  She does sta
te that she checks her blood sugar regularly and that her fasting sugar runs about 110-150, but she d
oes report that she has not been checking as much since she has been sick.  She does state that she i
s compliant with her insulin at home.  The patient has not established with a primary care physician 
here in [facility].  She was following with her primary care physician in [place], but has 
not seen him in several months.  In the emergency department, she was evaluated by Dr. Desir and was 
given 1 gram of Rocephin, Novolin 10 units, 1 liter of normal saline, and fentanyl 100 mcg.

 

The patient remained stable throughout the course of her hospital stay.  She did have lactic acidosis
 that resolved with fluid resuscitation and antibiotics.  The patient was noted to be hyperglycemic w
ithout any evidence of DKA.  Her blood sugar responded well to 10 units of Novolin.  Her ketones were
 negative.  The patient was started on a lower dose of her home insulin regimen; however, she had bee
n noncompliant while in the hospital and was refusing her insulin as she stated she was not eating, n
ot wanted to drop too low.  Her blood sugars have been running in the upper 200s-300s on a daily basi
s.  Her hemoglobin A1c was noted to be upwards of 13 indicating a lack of compliance with medication 
regimen.

 

The patient's headache was attributed to acute sinusitis, which was evident on CT.  She was given Anibal
ephin on day #1 and transitioned to Unasyn.  The patient did respond well and her symptoms improved a
fter fluids and antibiotics.  On the day of discharge, she was noted to be almost completely headache
 free.  She was transitioned to Augmentin for outpatient treatment.  Of note, ESR and CRP were elevat
ed as there was initial concern for potential temporal arteritis.  However, the patient's symptoms ar
e consistent with temporal arteritis as she is not having any temporal pain or other symptoms consist
ent with the diagnosis.  Her symptoms are most consistent with sinusitis as she is having facial pres
sure, particularly in the maxillary region on the right.

 

The patient was counseled on the importance of compliance with diabetic medications to prevent DKA or
 other associated diabetic illnesses.  She states that she takes 6 units of 70/30 at home b.i.d. on a
 regular basis.  It was highly encouraged that the patient establish with [facility] as she does 
not have insurance at this time.  Additionally, she can follow with her primary care physician in [facility] and that proves to be easier.  The patient states she should have insurance here within the next 
couple of months.  At that time, she is welcome to establish with [facility] for further ma
nya.

 

DISPOSITION:  Stable.

 

DISCHARGE INSTRUCTIONS:

1.  Location:  Home.

2.  Diet:  Heart healthy, consistent carb.

3.  Activity:  No restrictions.

4.  Followup:  The patient is to follow up with her primary care physician in [place] or with [facility] within the next 7 days to ensure resolution/improvement in symptoms.  The information for [facility] was provided to the patient upon discharge.  Additionally, once the patient is able to obt
[facility] insurance, she can follow with [facility].  This was discussed with the patient at [facility].  The patient is to continue Augmentin for 5 days postdischarge.  The patient was in understanding
 of the plan and agreeable. TRANSFER - OUT REPORT: 
 
Verbal report given to Juanjose Guajardo (name) on Barrera Chua  being transferred to [facility] (unit) for routine progression of care Report consisted of patients Situation, Background, Assessment and  
Recommendations(SBAR). Information from the following report(s) SBAR was reviewed with the receiving nurse. Lines:  
Peripheral IV 04/22/19 Left Wrist (Active) Site Assessment Clean, dry, & intact 4/22/2019  9:39 PM  
Phlebitis Assessment 0 4/22/2019  9:39 PM  
Infiltration Assessment 0 4/22/2019  9:39 PM  
Dressing Status Clean, dry, & intact 4/22/2019  9:39 PM  
Hub Color/Line Status Green 4/22/2019  9:39 PM  
  
 
Opportunity for questions and clarification was provided. Patient transported with: 
 Monitor

## 2024-06-27 ENCOUNTER — HOSPITAL ENCOUNTER (EMERGENCY)
Dept: HOSPITAL 92 - CSHERS | Age: 59
Discharge: HOME | End: 2024-06-27
Payer: SELF-PAY

## 2024-06-27 DIAGNOSIS — E11.22: ICD-10-CM

## 2024-06-27 DIAGNOSIS — R07.9: Primary | ICD-10-CM

## 2024-06-27 DIAGNOSIS — N18.9: ICD-10-CM

## 2024-06-27 DIAGNOSIS — Z79.4: ICD-10-CM

## 2024-06-27 DIAGNOSIS — I12.9: ICD-10-CM

## 2024-06-27 LAB
ALBUMIN SERPL BCG-MCNC: 2.7 G/DL (ref 3.5–5)
ALP SERPL-CCNC: 130 U/L (ref 40–110)
ALT SERPL W P-5'-P-CCNC: 42 U/L (ref 8–55)
ANION GAP SERPL CALC-SCNC: 15 MMOL/L (ref 10–20)
AST SERPL-CCNC: 40 U/L (ref 5–34)
BILIRUB SERPL-MCNC: 0.3 MG/DL (ref 0.2–1.2)
BUN SERPL-MCNC: 26 MG/DL (ref 9.8–20.1)
CALCIUM SERPL-MCNC: 8.4 MG/DL (ref 7.8–10.44)
CHLORIDE SERPL-SCNC: 101 MMOL/L (ref 98–107)
CO2 SERPL-SCNC: 21 MMOL/L (ref 22–29)
CREAT CL PREDICTED SERPL C-G-VRATE: 0 ML/MIN (ref 70–130)
GLOBULIN SER CALC-MCNC: 3.6 G/DL (ref 2.4–3.5)
GLUCOSE SERPL-MCNC: 260 MG/DL (ref 70–105)
HCT VFR BLD CALC: 37.1 % (ref 34.9–44.5)
HGB BLD-MCNC: 13.3 G/DL (ref 12–15.5)
MCH RBC QN AUTO: 28.7 PG (ref 27–33)
MCV RBC AUTO: 80.1 FL (ref 81.6–98.3)
MDIFF COMPLETE?: YES
MICROCYTES BLD QL SMEAR: (no result) (100X)
PLATELET # BLD AUTO: 251 10X3/UL (ref 150–450)
PLATELET BLD QL SMEAR: (no result)
POTASSIUM SERPL-SCNC: 4 MMOL/L (ref 3.5–5.1)
RBC # BLD AUTO: 4.63 10X6/UL (ref 3.9–5.03)
SODIUM SERPL-SCNC: 133 MMOL/L (ref 136–145)
TROPONIN I SERPL DL<=0.01 NG/ML-MCNC: 0.02 NG/ML (ref ?–0.03)
TROPONIN I SERPL DL<=0.01 NG/ML-MCNC: 0.03 NG/ML (ref ?–0.03)
WBC # BLD AUTO: 5.6 10X3/UL (ref 3.5–10.5)

## 2024-06-27 PROCEDURE — 84484 ASSAY OF TROPONIN QUANT: CPT

## 2024-06-27 PROCEDURE — 85025 COMPLETE CBC W/AUTO DIFF WBC: CPT

## 2024-06-27 PROCEDURE — 71045 X-RAY EXAM CHEST 1 VIEW: CPT

## 2024-06-27 PROCEDURE — 93005 ELECTROCARDIOGRAM TRACING: CPT

## 2024-06-27 PROCEDURE — 80053 COMPREHEN METABOLIC PANEL: CPT
